# Patient Record
Sex: FEMALE | Race: WHITE | NOT HISPANIC OR LATINO | URBAN - METROPOLITAN AREA
[De-identification: names, ages, dates, MRNs, and addresses within clinical notes are randomized per-mention and may not be internally consistent; named-entity substitution may affect disease eponyms.]

---

## 2017-04-06 ENCOUNTER — IMPORTED ENCOUNTER (OUTPATIENT)
Dept: URBAN - METROPOLITAN AREA CLINIC 50 | Facility: CLINIC | Age: 77
End: 2017-04-06

## 2017-04-12 ENCOUNTER — IMPORTED ENCOUNTER (OUTPATIENT)
Dept: URBAN - METROPOLITAN AREA CLINIC 50 | Facility: CLINIC | Age: 77
End: 2017-04-12

## 2017-08-25 ENCOUNTER — IMPORTED ENCOUNTER (OUTPATIENT)
Dept: URBAN - METROPOLITAN AREA CLINIC 50 | Facility: CLINIC | Age: 77
End: 2017-08-25

## 2018-09-19 ENCOUNTER — IMPORTED ENCOUNTER (OUTPATIENT)
Dept: URBAN - METROPOLITAN AREA CLINIC 50 | Facility: CLINIC | Age: 78
End: 2018-09-19

## 2019-01-16 ENCOUNTER — IMPORTED ENCOUNTER (OUTPATIENT)
Dept: URBAN - METROPOLITAN AREA CLINIC 50 | Facility: CLINIC | Age: 79
End: 2019-01-16

## 2019-10-02 ENCOUNTER — IMPORTED ENCOUNTER (OUTPATIENT)
Dept: URBAN - METROPOLITAN AREA CLINIC 50 | Facility: CLINIC | Age: 79
End: 2019-10-02

## 2019-10-02 NOTE — PATIENT DISCUSSION
"""Follow [facility]. No active CNV. s/p Avastin x 11 OD; x 6 OS.  Treated and followed by Dr. Shanell Mcfarlane

## 2020-10-06 ENCOUNTER — IMPORTED ENCOUNTER (OUTPATIENT)
Dept: URBAN - METROPOLITAN AREA CLINIC 50 | Facility: CLINIC | Age: 80
End: 2020-10-06

## 2021-03-18 ENCOUNTER — IMPORTED ENCOUNTER (OUTPATIENT)
Dept: URBAN - METROPOLITAN AREA CLINIC 50 | Facility: CLINIC | Age: 81
End: 2021-03-18

## 2021-04-18 ASSESSMENT — TONOMETRY
OS_IOP_MMHG: 13
OS_IOP_MMHG: 16
OD_IOP_MMHG: 14
OD_IOP_MMHG: 13
OD_IOP_MMHG: 14
OS_IOP_MMHG: 14
OD_IOP_MMHG: 16
OS_IOP_MMHG: 14

## 2021-04-18 ASSESSMENT — VISUAL ACUITY
OS_CC: 20/30
OS_CC: 20/30+1
OS_CC: 20/25-1
OS_CC: 20/25+2
OS_CC: J1+@ 16 IN
OD_PH: @ 16 IN
OD_CC: J1+@ 16 IN
OS_CC: J1+NEAR
OD_CC: 20/25-1
OS_CC: J1+@ 16 IN
OS_PH: @ 16 IN
OD_CC: 20/25+3
OD_CC: J1+@ 16 IN
OD_CC: 20/40
OD_CC: 20/25-2
OD_CC: J1+NEAR

## 2021-10-14 ENCOUNTER — PREPPED CHART (OUTPATIENT)
Dept: URBAN - METROPOLITAN AREA CLINIC 52 | Facility: CLINIC | Age: 81
End: 2021-10-14

## 2021-10-19 ENCOUNTER — COMPREHENSIVE EXAM (OUTPATIENT)
Dept: URBAN - METROPOLITAN AREA CLINIC 52 | Facility: CLINIC | Age: 81
End: 2021-10-19

## 2021-10-19 DIAGNOSIS — H43.813: ICD-10-CM

## 2021-10-19 DIAGNOSIS — H16.223: ICD-10-CM

## 2021-10-19 DIAGNOSIS — H35.033: ICD-10-CM

## 2021-10-19 DIAGNOSIS — H52.203: ICD-10-CM

## 2021-10-19 DIAGNOSIS — H52.13: ICD-10-CM

## 2021-10-19 DIAGNOSIS — H35.3232: ICD-10-CM

## 2021-10-19 DIAGNOSIS — H52.4: ICD-10-CM

## 2021-10-19 DIAGNOSIS — H35.042: ICD-10-CM

## 2021-10-19 PROCEDURE — 92134 CPTRZ OPH DX IMG PST SGM RTA: CPT

## 2021-10-19 PROCEDURE — 92014 COMPRE OPH EXAM EST PT 1/>: CPT

## 2021-10-19 PROCEDURE — 92015 DETERMINE REFRACTIVE STATE: CPT

## 2021-10-19 ASSESSMENT — TONOMETRY
OD_IOP_MMHG: 13
OS_IOP_MMHG: 13

## 2021-10-19 ASSESSMENT — VISUAL ACUITY
OS_CC: 20/25
OU_CC: J1
OD_CC: 20/25+2
OU_CC: 20/20-2

## 2021-10-19 NOTE — PATIENT DISCUSSION
Mild hypertensive retinopathy OU. Discussed with patient the signs and associated risks of potentially permanent damage to ocular structures due to systemic hypertension. Stressed the importance of maintaining a healthy, smoke-free life style. Encouraged patient to continue monitoring blood pressure at home, as well as maintaining compliance with prescribed hypertension medications. Patient instructed to continue systemic care with PCP regarding hypertensive status.

## 2021-10-19 NOTE — PATIENT DISCUSSION
Neovascular AMD with inactive CNV OU, s/p multiple injections, followed by Dr. Antoni Louise. Patient to keep f/u as scheduled with Dr. Antoni Louise.

## 2022-10-25 ENCOUNTER — COMPREHENSIVE EXAM (OUTPATIENT)
Dept: URBAN - METROPOLITAN AREA CLINIC 49 | Facility: CLINIC | Age: 82
End: 2022-10-25

## 2022-10-25 DIAGNOSIS — H52.203: ICD-10-CM

## 2022-10-25 DIAGNOSIS — H35.033: ICD-10-CM

## 2022-10-25 DIAGNOSIS — H35.3232: ICD-10-CM

## 2022-10-25 DIAGNOSIS — H43.813: ICD-10-CM

## 2022-10-25 DIAGNOSIS — H52.4: ICD-10-CM

## 2022-10-25 DIAGNOSIS — H16.223: ICD-10-CM

## 2022-10-25 DIAGNOSIS — H35.042: ICD-10-CM

## 2022-10-25 PROCEDURE — 92014 COMPRE OPH EXAM EST PT 1/>: CPT

## 2022-10-25 PROCEDURE — 92134 CPTRZ OPH DX IMG PST SGM RTA: CPT

## 2022-10-25 PROCEDURE — 92015 DETERMINE REFRACTIVE STATE: CPT

## 2022-10-25 ASSESSMENT — TONOMETRY
OD_IOP_MMHG: 14
OS_IOP_MMHG: 13

## 2022-10-25 ASSESSMENT — VISUAL ACUITY
OS_CC: 20/60
OU_CC: J1
OD_CC: 20/30+1

## 2022-10-25 NOTE — PATIENT DISCUSSION
Neovascular AMD with inactive CNV OU, s/p multiple injections, followed by Dr. Tricia Britton. Patient to keep f/u as scheduled with Dr. Tricia Britton.

## 2023-10-30 ENCOUNTER — COMPREHENSIVE EXAM (OUTPATIENT)
Dept: URBAN - METROPOLITAN AREA CLINIC 50 | Facility: LOCATION | Age: 83
End: 2023-10-30

## 2023-10-30 DIAGNOSIS — H16.223: ICD-10-CM

## 2023-10-30 DIAGNOSIS — H35.033: ICD-10-CM

## 2023-10-30 DIAGNOSIS — H35.3232: ICD-10-CM

## 2023-10-30 DIAGNOSIS — H43.813: ICD-10-CM

## 2023-10-30 PROCEDURE — 92015 DETERMINE REFRACTIVE STATE: CPT

## 2023-10-30 PROCEDURE — 92134 CPTRZ OPH DX IMG PST SGM RTA: CPT

## 2023-10-30 PROCEDURE — 76514 ECHO EXAM OF EYE THICKNESS: CPT

## 2023-10-30 PROCEDURE — 92014 COMPRE OPH EXAM EST PT 1/>: CPT

## 2023-10-30 ASSESSMENT — TONOMETRY
OD_IOP_MMHG: 20
OS_IOP_MMHG: 26
OS_IOP_MMHG: 22
OD_IOP_MMHG: 24

## 2023-10-30 ASSESSMENT — VISUAL ACUITY
OS_CC: 20/30-2
OU_CC: 20/25"16IN
OD_CC: 20/30-2
OU_CC: 20/25-1

## 2023-10-30 ASSESSMENT — PACHYMETRY
OS_CT_UM: 598
OD_CT_UM: 591